# Patient Record
Sex: MALE | Race: WHITE
[De-identification: names, ages, dates, MRNs, and addresses within clinical notes are randomized per-mention and may not be internally consistent; named-entity substitution may affect disease eponyms.]

---

## 2018-07-15 ENCOUNTER — HOSPITAL ENCOUNTER (OUTPATIENT)
Dept: HOSPITAL 31 - C.ER | Age: 28
Setting detail: OBSERVATION
LOS: 2 days | Discharge: HOME | End: 2018-07-17
Attending: FAMILY MEDICINE | Admitting: FAMILY MEDICINE
Payer: COMMERCIAL

## 2018-07-15 VITALS — RESPIRATION RATE: 20 BRPM

## 2018-07-15 VITALS — BODY MASS INDEX: 36.2 KG/M2

## 2018-07-15 DIAGNOSIS — L03.116: ICD-10-CM

## 2018-07-15 DIAGNOSIS — I80.02: Primary | ICD-10-CM

## 2018-07-15 DIAGNOSIS — N50.819: ICD-10-CM

## 2018-07-15 DIAGNOSIS — I83.892: ICD-10-CM

## 2018-07-15 DIAGNOSIS — F17.210: ICD-10-CM

## 2018-07-15 LAB
BASOPHILS # BLD AUTO: 0.1 K/UL (ref 0–0.2)
BASOPHILS NFR BLD: 0.5 % (ref 0–2)
BUN SERPL-MCNC: 16 MG/DL (ref 9–20)
CALCIUM SERPL-MCNC: 9.2 MG/DL (ref 8.6–10.4)
EOSINOPHIL # BLD AUTO: 0.3 K/UL (ref 0–0.7)
EOSINOPHIL NFR BLD: 2.7 % (ref 0–4)
ERYTHROCYTE [DISTWIDTH] IN BLOOD BY AUTOMATED COUNT: 14.1 % (ref 11.5–14.5)
GFR NON-AFRICAN AMERICAN: > 60
HGB BLD-MCNC: 13.4 G/DL (ref 12–18)
LYMPHOCYTES # BLD AUTO: 2.6 K/UL (ref 1–4.3)
LYMPHOCYTES NFR BLD AUTO: 21.6 % (ref 20–40)
MCH RBC QN AUTO: 30.2 PG (ref 27–31)
MCHC RBC AUTO-ENTMCNC: 34.5 G/DL (ref 33–37)
MCV RBC AUTO: 87.4 FL (ref 80–94)
MONOCYTES # BLD: 0.7 K/UL (ref 0–0.8)
MONOCYTES NFR BLD: 5.6 % (ref 0–10)
NEUTROPHILS # BLD: 8.3 K/UL (ref 1.8–7)
NEUTROPHILS NFR BLD AUTO: 69.6 % (ref 50–75)
NRBC BLD AUTO-RTO: 0 % (ref 0–2)
PLATELET # BLD: 157 K/UL (ref 130–400)
PMV BLD AUTO: 9.8 FL (ref 7.2–11.7)
RBC # BLD AUTO: 4.44 MIL/UL (ref 4.4–5.9)
WBC # BLD AUTO: 12 K/UL (ref 4.8–10.8)

## 2018-07-15 PROCEDURE — 80202 ASSAY OF VANCOMYCIN: CPT

## 2018-07-15 PROCEDURE — 83615 LACTATE (LD) (LDH) ENZYME: CPT

## 2018-07-15 PROCEDURE — 93971 EXTREMITY STUDY: CPT

## 2018-07-15 PROCEDURE — 96372 THER/PROPH/DIAG INJ SC/IM: CPT

## 2018-07-15 PROCEDURE — 90732 PPSV23 VACC 2 YRS+ SUBQ/IM: CPT

## 2018-07-15 PROCEDURE — 36415 COLL VENOUS BLD VENIPUNCTURE: CPT

## 2018-07-15 PROCEDURE — 80361 OPIATES 1 OR MORE: CPT

## 2018-07-15 PROCEDURE — 76870 US EXAM SCROTUM: CPT

## 2018-07-15 PROCEDURE — 74177 CT ABD & PELVIS W/CONTRAST: CPT

## 2018-07-15 PROCEDURE — 90471 IMMUNIZATION ADMIN: CPT

## 2018-07-15 PROCEDURE — 80353 DRUG SCREENING COCAINE: CPT

## 2018-07-15 PROCEDURE — 80048 BASIC METABOLIC PNL TOTAL CA: CPT

## 2018-07-15 PROCEDURE — 80324 DRUG SCREEN AMPHETAMINES 1/2: CPT

## 2018-07-15 PROCEDURE — 81001 URINALYSIS AUTO W/SCOPE: CPT

## 2018-07-15 PROCEDURE — 71260 CT THORAX DX C+: CPT

## 2018-07-15 PROCEDURE — 96374 THER/PROPH/DIAG INJ IV PUSH: CPT

## 2018-07-15 PROCEDURE — 83735 ASSAY OF MAGNESIUM: CPT

## 2018-07-15 PROCEDURE — 80358 DRUG SCREENING METHADONE: CPT

## 2018-07-15 PROCEDURE — 99285 EMERGENCY DEPT VISIT HI MDM: CPT

## 2018-07-15 PROCEDURE — 83992 ASSAY FOR PHENCYCLIDINE: CPT

## 2018-07-15 PROCEDURE — 96365 THER/PROPH/DIAG IV INF INIT: CPT

## 2018-07-15 PROCEDURE — 80053 COMPREHEN METABOLIC PANEL: CPT

## 2018-07-15 PROCEDURE — 85025 COMPLETE CBC W/AUTO DIFF WBC: CPT

## 2018-07-15 PROCEDURE — 80074 ACUTE HEPATITIS PANEL: CPT

## 2018-07-15 PROCEDURE — 80345 DRUG SCREENING BARBITURATES: CPT

## 2018-07-15 PROCEDURE — 87040 BLOOD CULTURE FOR BACTERIA: CPT

## 2018-07-15 PROCEDURE — 80346 BENZODIAZEPINES1-12: CPT

## 2018-07-15 PROCEDURE — 87086 URINE CULTURE/COLONY COUNT: CPT

## 2018-07-15 PROCEDURE — 84100 ASSAY OF PHOSPHORUS: CPT

## 2018-07-15 PROCEDURE — 80349 CANNABINOIDS NATURAL: CPT

## 2018-07-15 PROCEDURE — 86703 HIV-1/HIV-2 1 RESULT ANTBDY: CPT

## 2018-07-15 PROCEDURE — 73701 CT LOWER EXTREMITY W/DYE: CPT

## 2018-07-15 RX ADMIN — ENOXAPARIN SODIUM SCH MG: 120 INJECTION SUBCUTANEOUS at 19:30

## 2018-07-15 RX ADMIN — VANCOMYCIN HYDROCHLORIDE SCH MLS/HR: 1 INJECTION, POWDER, LYOPHILIZED, FOR SOLUTION INTRAVENOUS at 22:01

## 2018-07-15 RX ADMIN — TAZOBACTAM SODIUM AND PIPERACILLIN SODIUM SCH MLS/HR: 375; 3 INJECTION, SOLUTION INTRAVENOUS at 19:15

## 2018-07-15 RX ADMIN — Medication SCH: at 19:16

## 2018-07-15 NOTE — C.PDOC
History Of Present Illness


28 year old male presents to the ER complaining of increasing redness and pain 

to left thigh for 3 days. Patient denies trauma/injuries or any recent travel. 

Also denies fever, chills, vomiting, nausea or diarrhea. Patient has no other 

medical complaints. 


Time Seen by Provider: 07/15/18 10:12


Chief Complaint (Nursing): Lower Extremity Problem/Injury


History Per: Patient


History/Exam Limitations: no limitations


Onset/Duration Of Symptoms: Days (3)


Current Symptoms Are (Timing): Still Present





Past Medical History


Reviewed: Historical Data, Nursing Documentation, Vital Signs


Vital Signs: 


 Last Vital Signs











Temp  98.8 F   07/15/18 13:19


 


Pulse  70   07/15/18 13:19


 


Resp  16   07/15/18 13:19


 


BP  126/72   07/15/18 13:19


 


Pulse Ox  99   07/15/18 15:28














- Medical History


PMH: Back Problems


Surgical History: No Surg Hx


Family History: States: No Known Family Hx





- Social History


Hx Alcohol Use: Yes


Hx Substance Use: No





- Immunization History


Hx Tetanus Toxoid Vaccination: No


Hx Influenza Vaccination: No


Hx Pneumococcal Vaccination: No





Review Of Systems


Except As Marked, All Systems Reviewed And Found Negative.


Constitutional: Negative for: Fever, Chills


Gastrointestinal: Negative for: Nausea, Vomiting, Diarrhea


Skin: Positive for: Other (Pain and increasing redness to left thigh )





Physical Exam





- Physical Exam


Appears: Non-toxic, No Acute Distress


Skin: Other (Cullilitis with diffused induration to the dorsal distal aspect of 

left thigh. Questionable fluctuance versus induration. Large cellulitis on 

front thigh. Palpable cord. No gross line. Skin intact.  )


Head: Atraumatic, Normacephalic


Eye(s): bilateral: Normal Inspection


Nose: Normal


Oral Mucosa: Moist


Neck: Supple


Chest: Symmetrical


Cardiovascular: Rhythm Regular


Respiratory: Other (NARD)


Extremity: Normal ROM


Neurological/Psych: Oriented x3, Normal Speech


Gait: Steady





ED Course And Treatment





- Laboratory Results


Result Diagrams: 


 07/15/18 11:05





 07/15/18 11:05


O2 Sat by Pulse Oximetry: 99 (RA)


Pulse Ox Interpretation: Normal





- CT Scan/US


  ** CT Left lower ext 


Other Rad Studies (CT/US): Read By Radiologist, Radiology Report Reviewed


CT/US Interpretation: Accession No. : [id number].  Patient Name / ID : 

SYEDA OSCAR  / [id number].  Exam Date : 07/15/2018 14:07:38 ( Approved ).  

Study Comment :  Sex / Age : M  / 28Y.  Creator : Anastacio Aguilera MD.  

Dictator : Anastacio Aguilera MD.   :  Approver : Anastacio Aguilera MD.  Approver2 :  Report Date : 07/15/2018 15:00:08.  My Comment :  ************

***********************************************************************.  CT 

left thigh.  History: Pain and swelling.  Comparison: None available.  Technique

: Multiple contiguous axial images were performed through the left thigh with 

the use of intravenous contrast.  Subsequently, sagittal and coronal 

reformatted images were.  This CT exam was performed using one or more of the 

following dose reduction techniques: Automated exposure control, adjustment of 

the mA and/or kV according to patient size, and/or use of iterative 

reconstruction technique.  Findings:  Diffuse thickening of the anterior skin 

and subcutaneous soft tissues of the left thigh with associated reticulation 

and edema within the soft tissues suggestive for an underlying cellulitis.  At 

the site of the palpable abnormality as well as extending throughout the entire 

extent of the left anterior thigh are multiple large prominent vessels/

vasculature. The vessels are markedly enlarged and tortuous in comparison to 

the right thigh and may represent vascular malformations versus thrombosed 

varicosities versus additional etiology.  Clinical correlation.  Prominent 

lymphadenopathy within the left inguinal region measuring up to 3.8 x 2.3 

centimeters.  The visualized musculature appears preserved.  1.3 centimeter 

radiodense focus seen within the distal medullary cavity of the left femur of 

uncertain clinical etiology possibly representing a chondroid focus versus 

prominent bone island versus additional etiology.  Additional more sclerotic 

foci seen more laterally within the diaphysis of the distal left femur which 

may represent a small bone island.  Productive change at the level of the 

medial femoral condyle which may represent old MCL injury and/or Shantanu-

Stieda disease.  Impression:  1. Diffuse thickening of the anterior skin and 

subcutaneous soft tissues of the left thigh with associated reticulation and 

edema within the soft tissues suggestive for an underlying cellulitis.  2. At 

the site of the palpable abnormality as well as extending throughout the entire 

extent of the left anterior thigh are multiple large prominent vessels/

vasculature. The vessels are markedly enlarged and tortuous in comparison to 

the right thigh and may represent vascular malformations versus thrombosed 

varicosities versus additional etiology.  Clinical correlation.  3 Prominent 

lymphadenopathy within the left inguinal region measuring up to 3.8 x 2.3 

centimeters.  4. 1.3 centimeter radiodense focus seen within the distal 

medullary cavity of the left femur of uncertain clinical etiology possibly 

representing a chondroid focus versus prominent bone island versus additional 

etiology.  Additional findings as above.


Progress Note: CT Left Lower Ext ordered and results reviewed. Labs and blood 

work ordered.  Patient given Toradol 30mg IVP and Morphine 2mg IVP.





Progress





- Re-Evaluation


Re-evaluation Note: 





07/15/18 12:29


PENDING CT APPEARS COMFORTABLE.


07/15/18 13:29


PENDING CT


07/15/18 15:27


D/W DR THONY DE LA CRUZ WILL ADMIT





- Data Reviewed


Data Reviewed: Lab, Diagnostic imaging





Disposition


Counseled Patient/Family Regarding: Studies Performed, Diagnosis





- Disposition


Disposition: HOSPITALIZED


Disposition Time: 15:28


Condition: STABLE





- POA


Present On Arrival: None





- Clinical Impression


Clinical Impression: 


 Cellulitis








- Scribe Statement


The provider has reviewed the documentation as recorded by the Reva Patterson








All medical record entries made by the Ireneibrisa were at my direction and 

personally dictated by me. I have reviewed the chart and agree that the record 

accurately reflects my personal performance of the history, physical exam, 

medical decision making, and the department course for this patient. I have 

also personally directed, reviewed, and agree with the discharge instructions 

and disposition.





Decision To Admit





- Pt Status Changed To:


Hospital Disposition Of: Observation





- .


Bed Request Type: Regular


Admitting Physician: Lobito De La Cruz


Patient Diagnosis: 


 Cellulitis

## 2018-07-15 NOTE — CT
CT left thigh 



History: Pain and swelling. 



Comparison: None available. 



Technique: Multiple contiguous axial images were performed through 

the left thigh with the use of intravenous contrast.  Subsequently, 

sagittal and coronal reformatted images were. 



This CT exam was performed using one or more of the following dose 

reduction techniques: Automated exposure control, adjustment of the 

mA and/or kV according to patient size, and/or use of iterative 

reconstruction technique. 



Findings: 



Diffuse thickening of the anterior skin and subcutaneous soft tissues 

of the left thigh with associated reticulation and edema within the 

soft tissues suggestive for an underlying cellulitis. 



At the site of the palpable abnormality as well as extending 

throughout the entire extent of the left anterior thigh are multiple 

large prominent vessels/vasculature. The vessels are markedly 

enlarged and tortuous in comparison to the right thigh and may 

represent vascular malformations versus thrombosed varicosities 

versus additional etiology.  Clinical correlation. 



Prominent lymphadenopathy within the left inguinal region measuring 

up to 3.8 x 2.3 centimeters. 



The visualized musculature appears preserved. 



1.3 centimeter radiodense focus seen within the distal medullary 

cavity of the left femur of uncertain clinical etiology possibly 

representing a chondroid focus versus prominent bone island versus 

additional etiology. 



Additional more sclerotic foci seen more laterally within the 

diaphysis of the distal left femur which may represent a small bone 

island. 



Productive change at the level of the medial femoral condyle which 

may represent old MCL injury and/or Shantanu-Stieda disease. 



Impression: 



1. Diffuse thickening of the anterior skin and subcutaneous soft 

tissues of the left thigh with associated reticulation and edema 

within the soft tissues suggestive for an underlying cellulitis. 



2. At the site of the palpable abnormality as well as extending 

throughout the entire extent of the left anterior thigh are multiple 

large prominent vessels/vasculature. The vessels are markedly 

enlarged and tortuous in comparison to the right thigh and may 

represent vascular malformations versus thrombosed varicosities 

versus additional etiology.  Clinical correlation. 



3 Prominent lymphadenopathy within the left inguinal region measuring 

up to 3.8 x 2.3 centimeters. 



4. 1.3 centimeter radiodense focus seen within the distal medullary 

cavity of the left femur of uncertain clinical etiology possibly 

representing a chondroid focus versus prominent bone island versus 

additional etiology. 



Additional findings as above.

## 2018-07-15 NOTE — CP.PCM.HP
<Stan Calles - Last Filed: 07/15/18 16:16>





History of Present Illness





- History of Present Illness


History of Present Illness: 


CC: left leg pain


HPI: 28 year old  male w/ no PMHx presents to ED w/ 3 days history of 

left upper leg swelling with increasing pain. The pain started as a small nodule

, just superior to the knee that has spread superiorly to his inguinal area. 

Patient states that the leg feels warmer and is unable to stand w/o pain. Pain 

is currently rated as a 6/10. Patient reports trying some antibiotics from [place] 

[place] country [place]unsure of name[place] for 2 days prior w/ no relief of symptoms.  

Patient reports having a similar episode 1 year prior that resolved with some 

antibiotics. 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 

trauma to the area.   





ROS: Patient denies fevers, chills, night sweats, weight loss, chest pain, 

shortness of breath, abdominal pain, nausea, vomiting, lower extremity 

swelling. Patient states he has sometimes difficulty voiding for the past 

several months. 





PMD: None


PMHx: None


PSHx: None


Meds: None 


Allergies: None


FHx: Father, HTN, MI,  at 75 yr; Mother, HTN, living; Sister, healthy, 

living


Social: Lives with wife, Works as a cook, Smokes 1-2 cigarettes/day for 14 years

, drinks socially, smokes marijuana 





Code status: Full code


Emergency Contact: WifeEbonie: [phone]











Present on Admission





- Present on Admission


Any Indicators Present on Admission: No





Review of Systems





- Constitutional


Constitutional: absent: Chills, Fever, Headache, Malaise, Weight Loss





- EENT


Eyes: absent: Blurred Vision, Diplopia, Dry Eye, Irritation


Nose/Mouth/Throat: absent: Dry Mouth, Halitosis, Mouth Lesions





- Cardiovascular


Cardiovascular: absent: Chest Pain, Leg Edema, Palpitations, Pedal Edema





- Respiratory


Respiratory: absent: Cough, Dyspnea, Wheezing





- Gastrointestinal


Gastrointestinal: absent: Abdominal Pain, Diarrhea, Dyspepsia





- Genitourinary


Genitourinary: Voiding Freq/Small Amts.  absent: Flank Pain, Hematuria





- Musculoskeletal


Musculoskeletal: Back Pain.  absent: Joint Swelling, Muscle Weakness, Myalgias, 

Neck Pain





- Integumentary


Integumentary: absent: Photosensitivity, Sores, Striae





- Neurological


Neurological: absent: Burning Sensations, Numbness, Focal Weakness, Loss of 

Vision





- Hematologic/Lymphatic


Hematologic: absent: Easy Bleeding, Easy Bruising





Past Patient History





- Past Social History


Smoking Status: Light Smoker < 10 Cigarettes Daily





- CARDIAC


Hx Cardiac Disorders: No





- PSYCHIATRIC


Hx Substance Use: No





- SURGICAL HISTORY


Hx Surgeries: No





- ANESTHESIA


Hx Anesthesia: No


Hx Anesthesia Reactions: No





Meds


Allergies/Adverse Reactions: 


 Allergies











Allergy/AdvReac Type Severity Reaction Status Date / Time


 


No Known Allergies Allergy   Verified 07/15/18 09:53














Physical Exam





- Constitutional


Appears: Well, Non-toxic, No Acute Distress





- Head Exam


Head Exam: ATRAUMATIC, NORMAL INSPECTION, NORMOCEPHALIC





- Eye Exam


Eye Exam: EOMI, Normal appearance


Pupil Exam: NORMAL ACCOMODATION, PERRL





- ENT Exam


ENT Exam: Mucous Membranes Moist





- Respiratory Exam


Respiratory Exam: Clear to Auscultation Bilateral, NORMAL BREATHING PATTERN.  

absent: Rhonchi, Wheezes, Respiratory Distress





- Cardiovascular Exam


Cardiovascular Exam: +S1, +S2.  absent: Irregular Rhythm, Systolic Murmur





- GI/Abdominal Exam


GI & Abdominal Exam: Normal Bowel Sounds, Soft.  absent: Distended, Firm, 

Guarding





- Extremities Exam


Extremities exam: Positive for: full ROM, normal inspection, pedal pulses 

present.  Negative for: calf tenderness, pedal edema


Additional comments: 





- LLE, warm to touch at thigh area


- Discoloration 1/2 way upper thigh, likely tan from shorts - as clear, 

straight line demarcation


- Supper 1/3 of thigh has blanchable erythema


- Palpable chord that starts near lateral area of patella that moves superiorly 

and runs medially into the inguinal region





- Back Exam


Back exam: NORMAL INSPECTION





- Neurological Exam


Neurological exam: Alert, CN II-XII Intact, Oriented x3





- Psychiatric Exam


Psychiatric exam: Normal Affect, Normal Mood





- Skin


Skin Exam: Dry, Intact, Normal Color, Warm


Additional comments: 





- LLE, warm to touch at thigh area


- Discoloration 1/2 way upper thigh, likely tan from shorts - as clear, 

straight line demarcation


- Supper 1/3 of thigh has blanchable erythema


- Palpable chord that starts near lateral area of patella that moves superiorly 

and runs medially into the inguinal region





Results





- Vital Signs


Recent Vital Signs: 





 Last Vital Signs











Temp  98.8 F   07/15/18 13:19


 


Pulse  70   07/15/18 13:19


 


Resp  16   07/15/18 13:19


 


BP  126/72   07/15/18 13:19


 


Pulse Ox  99   07/15/18 15:49














- Labs


Result Diagrams: 


 07/15/18 11:05





 07/15/18 11:05


Labs: 





 Laboratory Results - last 24 hr











  07/15/18 07/15/18





  11:05 11:05


 


WBC  12.0 H 


 


RBC  4.44 


 


Hgb  13.4 


 


Hct  38.9 


 


MCV  87.4 


 


MCH  30.2 


 


MCHC  34.5 


 


RDW  14.1 


 


Plt Count  157 


 


MPV  9.8 


 


Neut % (Auto)  69.6 


 


Lymph % (Auto)  21.6 


 


Mono % (Auto)  5.6 


 


Eos % (Auto)  2.7 


 


Baso % (Auto)  0.5 


 


Neut # (Auto)  8.3 H 


 


Lymph # (Auto)  2.6 


 


Mono # (Auto)  0.7 


 


Eos # (Auto)  0.3 


 


Baso # (Auto)  0.1 


 


Sodium   143


 


Potassium   3.6


 


Chloride   104


 


Carbon Dioxide   28


 


Anion Gap   14


 


BUN   16


 


Creatinine   0.8


 


Est GFR ( Amer)   > 60


 


Est GFR (Non-Af Amer)   > 60


 


Random Glucose   84


 


Calcium   9.2














Assessment & Plan





- Assessment and Plan (Free Text)


Assessment: 





1) Left thigh cellulits &  thrombophlebitis


- 7/15 CT results: 


   1. Diffuse thickening of the anterior skin and subcutaneous soft tissues of 

the left thigh with associated reticulation and edema within the soft tissues 

   suggestive for an underlying cellulitis. 


   2. At the site of the palpable abnormality as well as extending throughout 

the entire extent of the left anterior thigh are multiple large prominent 

vessels/vasculature. The vessels are markedly enlarged and tortuous in 

comparison to the right thigh and may represent vascular malformations versus 

thrombosed varicosities versus additional etiology.  Clinical correlation. 


   3 Prominent lymphadenopathy within the left inguinal region measuring up to 

3.8 x 2.3 centimeters. 


   4. 1.3 centimeter radiodense focus seen within the distal medullary cavity 

of the left femur of uncertain clinical etiology possibly representing a 

chondroid focus versus prominent bone island versus additional etiology. 





- Vancomycin 1 gm Q12H


- F/u Vanc troph 11:30 PM 18


- Zosyn 3.375 gm Q6 Hr


- F/u blood cultures from 7/15


- Ibuprofen 600 mg PO Q8H


- Warm compress to entire left thigh


- Keep left leg elevated above, level of heart


- Toradol 15 mg IV Q6H Mod Pain PRN


- Toradol 30 mg IV Q6H Severe Pain pRN


- Protonix 40m PO Daily


- Venous Duplex L left r/o DVT


- Lovenox 120 mg Sc Q12H 6PM (D/Cif venous duplex negative





2) Leukocytosis


- WBC slightly elevated w/o shift


- continue to monitor





3) Unable to void complete


- perform prostate examination once to the floors 





4) Prophylaxis


- Protonix as above


- Lovonox


- Heart healthy 2 gm low carb diet


 





<Lobito De La Cruz - Last Filed: 07/15/18 20:57>





Results





- Vital Signs


Recent Vital Signs: 





 Last Vital Signs











Temp  98.4 F   07/15/18 20:46


 


Pulse  47 L  07/15/18 20:46


 


Resp  20   07/15/18 20:46


 


BP  146/81   07/15/18 20:46


 


Pulse Ox  98   07/15/18 20:46














- Labs


Result Diagrams: 


 07/15/18 11:05





 07/15/18 11:05


Labs: 





 Laboratory Results - last 24 hr











  07/15/18 07/15/18





  11:05 11:05


 


WBC  12.0 H 


 


RBC  4.44 


 


Hgb  13.4 


 


Hct  38.9 


 


MCV  87.4 


 


MCH  30.2 


 


MCHC  34.5 


 


RDW  14.1 


 


Plt Count  157 


 


MPV  9.8 


 


Neut % (Auto)  69.6 


 


Lymph % (Auto)  21.6 


 


Mono % (Auto)  5.6 


 


Eos % (Auto)  2.7 


 


Baso % (Auto)  0.5 


 


Neut # (Auto)  8.3 H 


 


Lymph # (Auto)  2.6 


 


Mono # (Auto)  0.7 


 


Eos # (Auto)  0.3 


 


Baso # (Auto)  0.1 


 


Sodium   143


 


Potassium   3.6


 


Chloride   104


 


Carbon Dioxide   28


 


Anion Gap   14


 


BUN   16


 


Creatinine   0.8


 


Est GFR ( Amer)   > 60


 


Est GFR (Non-Af Amer)   > 60


 


Random Glucose   84


 


Calcium   9.2














Attending/Attestation





- Attestation


I have personally seen and examined this patient.: Yes


I have fully participated in the care of the patient.: Yes


I have reviewed all pertinent clinical information: Yes


Notes (Text): 





07/15/18 20:56


Patient was seen and examined with resident Dr. Calles.





History, exam, assessment and plan were gone over with the resident.





F/U with ID Dr. Johnson and Vascular Surgeon Dr. Buck for further 

recommendations.





Lobito De La Cruz D.O.

## 2018-07-16 LAB
ALBUMIN SERPL-MCNC: 3.7 G/DL (ref 3.5–5)
ALBUMIN/GLOB SERPL: 1.3 {RATIO} (ref 1–2.1)
ALT SERPL-CCNC: 34 U/L (ref 21–72)
AST SERPL-CCNC: 31 U/L (ref 17–59)
BASOPHILS # BLD AUTO: 0 K/UL (ref 0–0.2)
BASOPHILS NFR BLD: 0.5 % (ref 0–2)
BUN SERPL-MCNC: 15 MG/DL (ref 9–20)
CALCIUM SERPL-MCNC: 8.6 MG/DL (ref 8.6–10.4)
EOSINOPHIL # BLD AUTO: 0.3 K/UL (ref 0–0.7)
EOSINOPHIL NFR BLD: 3.8 % (ref 0–4)
ERYTHROCYTE [DISTWIDTH] IN BLOOD BY AUTOMATED COUNT: 14.1 % (ref 11.5–14.5)
GFR NON-AFRICAN AMERICAN: > 60
HGB BLD-MCNC: 13 G/DL (ref 12–18)
LYMPHOCYTES # BLD AUTO: 2 K/UL (ref 1–4.3)
LYMPHOCYTES NFR BLD AUTO: 21.7 % (ref 20–40)
MCH RBC QN AUTO: 30.4 PG (ref 27–31)
MCHC RBC AUTO-ENTMCNC: 35.1 G/DL (ref 33–37)
MCV RBC AUTO: 86.7 FL (ref 80–94)
MONOCYTES # BLD: 0.6 K/UL (ref 0–0.8)
MONOCYTES NFR BLD: 6.8 % (ref 0–10)
NEUTROPHILS # BLD: 6.2 K/UL (ref 1.8–7)
NEUTROPHILS NFR BLD AUTO: 67.2 % (ref 50–75)
NRBC BLD AUTO-RTO: 0.1 % (ref 0–2)
PLATELET # BLD: 142 K/UL (ref 130–400)
PMV BLD AUTO: 9.6 FL (ref 7.2–11.7)
RBC # BLD AUTO: 4.29 MIL/UL (ref 4.4–5.9)
WBC # BLD AUTO: 9.2 K/UL (ref 4.8–10.8)

## 2018-07-16 RX ADMIN — VANCOMYCIN HYDROCHLORIDE SCH MLS/HR: 1 INJECTION, POWDER, LYOPHILIZED, FOR SOLUTION INTRAVENOUS at 10:41

## 2018-07-16 RX ADMIN — TAZOBACTAM SODIUM AND PIPERACILLIN SODIUM SCH MLS/HR: 375; 3 INJECTION, SOLUTION INTRAVENOUS at 00:16

## 2018-07-16 RX ADMIN — Medication SCH MG: at 17:34

## 2018-07-16 RX ADMIN — ENOXAPARIN SODIUM SCH: 120 INJECTION SUBCUTANEOUS at 07:00

## 2018-07-16 RX ADMIN — TAZOBACTAM SODIUM AND PIPERACILLIN SODIUM SCH MLS/HR: 375; 3 INJECTION, SOLUTION INTRAVENOUS at 17:33

## 2018-07-16 RX ADMIN — VANCOMYCIN HYDROCHLORIDE SCH MLS/HR: 1 INJECTION, POWDER, LYOPHILIZED, FOR SOLUTION INTRAVENOUS at 22:07

## 2018-07-16 RX ADMIN — PANTOPRAZOLE SODIUM SCH MG: 40 TABLET, DELAYED RELEASE ORAL at 10:33

## 2018-07-16 RX ADMIN — TAZOBACTAM SODIUM AND PIPERACILLIN SODIUM SCH MLS/HR: 375; 3 INJECTION, SOLUTION INTRAVENOUS at 05:39

## 2018-07-16 RX ADMIN — Medication SCH MG: at 10:34

## 2018-07-16 RX ADMIN — TAZOBACTAM SODIUM AND PIPERACILLIN SODIUM SCH MLS/HR: 375; 3 INJECTION, SOLUTION INTRAVENOUS at 12:36

## 2018-07-16 NOTE — VASCLAB
Date of service: 



07/16/2018



PROCEDURE:  Left Lower Extremity Venous Duplex Exam. 



HISTORY:

Superficial Thrombophlebitis., R/O DVT 



PRIORS:

None. 



TECHNIQUE:

Left common femoral, femoral, popliteal and posterior tibial, 

peroneal and great saphenous veins were evaluated. Flow was assessed 

with color Doppler, compressibility, assessment of phasic flow and 

augmentation response.



Report prepared by   RUPERTO Saunders



FINDINGS:



LEFT:

1. Common Femoral Vein:



1.1.  Compressibility - Fully compressible: Thrombus - None : Flow - 

Phasic: Augmentation -Normal: Reflux - None.



2. Femoral Vein: 



2.1. Compressibility - Fully compressible: Thrombus -  None: Flow - 

Phasic: Augmentation -Normal: Reflux - None.



3. Popliteal Vein: 



3.1. Compressibility - Fully compressible: Thrombus -  None: Flow - 

Phasic: Augmentation -Normal: Reflux - None.



4. Posterior Tibial Vein: 



4.1. Compressibility - Fully compressible: Thrombus -  None: Flow - 

Phasic: Augmentation -Normal: Reflux - None.



5. Peroneal Vein: 



5.1. Compressibility - Fully compressible: Thrombus -  None: Flow - 

Phasic: Augmentation -Normal: Reflux - None.



6. Great Saphenous Vein:

6.1.  Compressibility - Fully compressible: Thrombus - None: Flow - 

Phasic: Augmentation - Normal: Reflux - None.





OTHER FINDINGS:  



IMPRESSION:

1. Superficial phlebitis in the left lower extremity, involving 

multiple varicose veins in the outer lateral low thigh and knee 

areas. 



2. No evidence of deep  vein thrombosis of the left lower extremity.



Normal venous flow noted in the right common femoral vein.

## 2018-07-16 NOTE — CP.PCM.CON
History of Present Illness





- History of Present Illness


History of Present Illness: 





28 year old  male  presents to ED w/ 3 days history of left upper leg 

swelling with increasing pain. The pain started as a small nodule, just 

superior to the knee that has spread superiorly to his inguinal area. He 

reports [place] some antibiotics from [place]ure of name[place] for 2 

days prior w/ no relief of symptoms.  





ROS: Patient denies fevers, chills, night sweats, weight loss, chest pain, 

shortness of breath, abdominal pain, nausea, vomiting, lower extremity 

swelling. Patient states he has sometimes difficulty voiding for the past 

several months. 





PMD: None


PMHx: None


PSHx: None


Meds: None 


Allergies: None


FHx: Father, HTN, MI,  at 75 yr; Mother, HTN, living; Sister, healthy, 

living


Social: Lives with wife, Works as a cook, Smokes 1-2 cigarettes/day for 14 years

, drinks socially, smokes marijuana 





Code status: Full code


Emergency Contact: Wife, Ebonie Fuller: [phone]











Present on Admission





- Present on Admission


Any Indicators Present on Admission: No





Review of Systems





- Constitutional


Constitutional: absent: Chills, Fever, Headache, Malaise, Weight Loss





- EENT


Eyes: absent: Blurred Vision, Diplopia, Dry Eye, Irritation


Nose/Mouth/Throat: absent: Dry Mouth, Halitosis, Mouth Lesions





- Cardiovascular


Cardiovascular: absent: Chest Pain, Leg Edema, Palpitations, Pedal Edema





- Respiratory


Respiratory: absent: Cough, Dyspnea, Wheezing





- Gastrointestinal


Gastrointestinal: absent: Abdominal Pain, Diarrhea, Dyspepsia





- Genitourinary


Genitourinary: Voiding Freq/Small Amts.  absent: Flank Pain, Hematuria





- Musculoskeletal


Musculoskeletal: Back Pain.  absent: Joint Swelling, Muscle Weakness, Myalgias, 

Neck Pain





- Integumentary


Integumentary: absent: Photosensitivity, Sores, Striae





- Neurological


Neurological: absent: Burning Sensations, Numbness, Focal Weakness, Loss of 

Vision





- Hematologic/Lymphatic


Hematologic: absent: Easy Bleeding, Easy Bruising





Past Patient History





- Past Social History


Smoking Status: Light Smoker < 10 Cigarettes Daily





- CARDIAC


Hx Cardiac Disorders: No





- PSYCHIATRIC


Hx Substance Use: No





- SURGICAL HISTORY


Hx Surgeries: No





- ANESTHESIA


Hx Anesthesia: No


Hx Anesthesia Reactions: No





Meds


Allergies/Adverse Reactions: 


 Allergies











Allergy/AdvReac Type Severity Reaction Status Date / Time


 


No Known Allergies Allergy   Verified 07/15/18 09:53














- Medications


Medications: 


 Current Medications





Vancomycin/Sodium Chloride (Vancomycin 1 Gm/Ns 200 Ml)  1 gm in 200 mls @ 166.6 

mls/hr IVPB Q12H [facility]


   PRN Reason: Protocol


   Stop: 18 23:01


   Last Admin: 18 10:41 Dose:  166.6 mls/hr


Piperacillin Sod/Tazobactam Sod (Zosyn 3.375 Gm Iv Premix)  3.375 gm in 50 mls 

@ 100 mls/hr IVPB Q6H [facility]


   PRN Reason: Protocol


   Last Admin: 18 12:36 Dose:  100 mls/hr


Ibuprofen (Motrin Tab)  600 mg PO TID [facility]


   Last Admin: 18 10:34 Dose:  600 mg


Ketorolac Tromethamine (Toradol)  30 mg IVP Q6H PRN


   PRN Reason: Pain, severe (8-10)


   Last Admin: 18 12:40 Dose:  30 mg


Ketorolac Tromethamine (Toradol)  15 mg IVP Q6H PRN


   PRN Reason: Pain, moderate (4-7)


Pantoprazole Sodium (Protonix Ec Tab)  40 mg PO DAILY [facility]


   Last Admin: 18 10:33 Dose:  40 mg


Pneumococcal Polyvalent Vaccine (Pneumovax 23 Vaccine)  0.5 ml IM .ONCE ONE


   Stop: 18 10:01


Saccharomyces Boulardii (Florastor)  250 mg PO BID [facility]


   Last Admin: 18 10:34 Dose:  250 mg











Physical Exam





- Constitutional


Appears: Chronically Ill





- Head Exam


Head Exam: ATRAUMATIC





- Eye Exam


Eye Exam: Scleral icterus





- ENT Exam


ENT Exam: Mucous Membranes Dry, Normal External Ear Exam





- Neck Exam


Neck exam: Negative for: Lymphadenopathy





- Respiratory Exam


Respiratory Exam: Decreased Breath Sounds





- Cardiovascular Exam


Cardiovascular Exam: REGULAR RHYTHM





- GI/Abdominal Exam


GI & Abdominal Exam: Diminished Bowel Sounds





- Rectal Exam


Rectal Exam: Deferred





- Extremities Exam


Extremities exam: Positive for: tenderness, pedal pulses present.  Negative for

: calf tenderness, normal inspection, pedal edema


Additional comments: 





swollen left leg over saphenous vein with induration of varicosities  + tender 





- Back Exam


Back exam: absent: CVA tenderness (L), CVA tenderness (R), paraspinal tenderness





- Neurological Exam


Neurological exam: Alert, CN II-XII Intact, Oriented x3, Reflexes Normal





- Psychiatric Exam


Psychiatric exam: Normal Mood





- Skin


Skin Exam: Erythema





Results





- Vital Signs


Recent Vital Signs: 


 Last Vital Signs











Temp  98.5 F   18 08:00


 


Pulse  52 L  18 08:00


 


Resp  20   18 08:00


 


BP  132/92 H  18 08:00


 


Pulse Ox  99   18 08:00














- Labs


Result Diagrams: 


 18 06:52





 18 06:52


Labs: 


 Laboratory Results - last 24 hr











  18





  06:52 06:52 07:00


 


WBC  9.2  


 


RBC  4.29 L  


 


Hgb  13.0  


 


Hct  37.2  


 


MCV  86.7  


 


MCH  30.4  


 


MCHC  35.1  


 


RDW  14.1  


 


Plt Count  142  


 


MPV  9.6  


 


Neut % (Auto)  67.2  


 


Lymph % (Auto)  21.7  


 


Mono % (Auto)  6.8  


 


Eos % (Auto)  3.8  


 


Baso % (Auto)  0.5  


 


Neut # (Auto)  6.2  


 


Lymph # (Auto)  2.0  


 


Mono # (Auto)  0.6  


 


Eos # (Auto)  0.3  


 


Baso # (Auto)  0.0  


 


Sodium   140 


 


Potassium   3.8 


 


Chloride   105 


 


Carbon Dioxide   26 


 


Anion Gap   12 


 


BUN   15 


 


Creatinine   0.7 L 


 


Est GFR ( Amer)   > 60 


 


Est GFR (Non-Af Amer)   > 60 


 


Random Glucose   86 


 


Calcium   8.6 


 


Total Bilirubin   0.8 


 


AST   31 


 


ALT   34 


 


Alkaline Phosphatase   75 


 


Total Protein   6.7 


 


Albumin   3.7 


 


Globulin   3.0 


 


Albumin/Globulin Ratio   1.3 


 


Urine Opiates Screen    Negative


 


Urine Methadone Screen    Negative


 


Ur Barbiturates Screen    Negative


 


Ur Phencyclidine Scrn    Negative


 


Ur Amphetamines Screen    Negative


 


U Benzodiazepines Scrn    Negative


 


U Oth Cocaine Metabols    Negative


 


U Cannabinoids Screen    Positive H














Assessment & Plan


(1) Phlebitis


Status: Acute   





(2) Cellulitis


Status: Acute   





- Assessment and Plan (Free Text)


Assessment: 





superficial phlebitis/ cellulitis


consider vascular eval


rx iv antibiotics with coverage for strep ands  staph

## 2018-07-16 NOTE — CP.PCM.CON
History of Present Illness





- History of Present Illness


History of Present Illness: 





Vascular Surgery Note: Dr. Buck


28M with no significant  PMHx presented to [facility] ED withs complaint of left 

leg pain. Patient states pain began 3 days ago. He describes pain began along a 

small nodule that had developed in his left medial thigh above knee joint. 

Patient admits to taking non-prescribed antibiotics at home which did not help 

alleviate symptoms. Patient reports erythema and nodularity began to travel 

towards his groin region. Patient reports last year he had a similar episode 

which resolved with antibiotics. At time of examination patient denied fever/

chill, headache/dizziness, chest pain, SOB, n/v/d.   








PMHx/PSHx: None


All: NKDA


Social: Admits to smoking 1-2 cigs/day x14yrs , EtOH use socially, admits to 

smoking marijuana 








Review of Systems





- Review of Systems


Review of Systems: 





12 pt ROS unremarkable except as stated in HPI 





Past Patient History





- Past Social History


Smoking Status: Light Smoker < 10 Cigarettes Daily





- CARDIAC


Hx Cardiac Disorders: No





- PSYCHIATRIC


Hx Substance Use: No





- SURGICAL HISTORY


Hx Surgeries: No





- ANESTHESIA


Hx Anesthesia: No


Hx Anesthesia Reactions: No





Meds


Allergies/Adverse Reactions: 


 Allergies











Allergy/AdvReac Type Severity Reaction Status Date / Time


 


No Known Allergies Allergy   Verified 07/15/18 09:53














- Medications


Medications: 


 Current Medications





Enoxaparin Sodium (Lovenox)  120 mg SC Q12H [facility]


   Last Admin: 07/15/18 19:30 Dose:  120 mg


Vancomycin/Sodium Chloride (Vancomycin 1 Gm/Ns 200 Ml)  1 gm in 200 mls @ 166.6 

mls/hr IVPB Q12H [facility]


   PRN Reason: Protocol


   Stop: 07/20/18 23:01


   Last Admin: 07/15/18 22:01 Dose:  166.6 mls/hr


Piperacillin Sod/Tazobactam Sod (Zosyn 3.375 Gm Iv Premix)  3.375 gm in 50 mls 

@ 100 mls/hr IVPB Q6H [facility]


   PRN Reason: Protocol


   Last Admin: 07/16/18 00:16 Dose:  100 mls/hr


Ibuprofen (Motrin Tab)  600 mg PO TID [facility]


   Last Admin: 07/15/18 19:14 Dose:  600 mg


Ketorolac Tromethamine (Toradol)  30 mg IVP Q6H PRN


   PRN Reason: Pain, severe (8-10)


Ketorolac Tromethamine (Toradol)  15 mg IVP Q6H PRN


   PRN Reason: Pain, moderate (4-7)


Pantoprazole Sodium (Protonix Ec Tab)  40 mg PO DAILY [facility]


Pneumococcal Polyvalent Vaccine (Pneumovax 23 Vaccine)  0.5 ml IM .ONCE ONE


   Stop: 07/17/18 10:01


Saccharomyces Boulardii (Florastor)  250 mg PO BID [facility]


   Last Admin: 07/15/18 19:16 Dose:  Not Given











Physical Exam





- Constitutional


Appears: No Acute Distress





- Head Exam


Head Exam: NORMOCEPHALIC





- Eye Exam


Eye Exam: EOMI, Normal appearance





- ENT Exam


ENT Exam: Mucous Membranes Moist





- Respiratory Exam


Respiratory Exam: NORMAL BREATHING PATTERN





- Cardiovascular Exam


Cardiovascular Exam: +S1, +S2





- GI/Abdominal Exam


GI & Abdominal Exam: Soft





- Extremities Exam


Extremities exam: Positive for: pedal edema, pedal pulses present


Additional comments: 





left thigh superficial thrombophlebitis


cellulitis





- Neurological Exam


Neurological exam: Alert, Oriented x3





- Psychiatric Exam


Psychiatric exam: Normal Mood





- Skin


Skin Exam: Dry, Intact, Warm





Results





- Vital Signs


Recent Vital Signs: 


 Last Vital Signs











Temp  98.4 F   07/15/18 20:46


 


Pulse  41 L  07/15/18 21:15


 


Resp  20   07/15/18 20:46


 


BP  119/73   07/15/18 21:15


 


Pulse Ox  98   07/15/18 22:06














- Labs


Result Diagrams: 


 07/15/18 11:05





 07/15/18 11:05


Labs: 


 Laboratory Results - last 24 hr











  07/15/18 07/15/18





  11:05 11:05


 


WBC  12.0 H 


 


RBC  4.44 


 


Hgb  13.4 


 


Hct  38.9 


 


MCV  87.4 


 


MCH  30.2 


 


MCHC  34.5 


 


RDW  14.1 


 


Plt Count  157 


 


MPV  9.8 


 


Neut % (Auto)  69.6 


 


Lymph % (Auto)  21.6 


 


Mono % (Auto)  5.6 


 


Eos % (Auto)  2.7 


 


Baso % (Auto)  0.5 


 


Neut # (Auto)  8.3 H 


 


Lymph # (Auto)  2.6 


 


Mono # (Auto)  0.7 


 


Eos # (Auto)  0.3 


 


Baso # (Auto)  0.1 


 


Sodium   143


 


Potassium   3.6


 


Chloride   104


 


Carbon Dioxide   28


 


Anion Gap   14


 


BUN   16


 


Creatinine   0.8


 


Est GFR ( Amer)   > 60


 


Est GFR (Non-Af Amer)   > 60


 


Random Glucose   84


 


Calcium   9.2














Assessment & Plan





- Assessment and Plan (Free Text)


Assessment: 





28M with left thigh superficial thrombophlebitis and cellulitis 


Plan: 





ABx


NSAIDs prn


Warm compresses to affected region 


F/u Venous duplex


Elevate affected leg


DVT ppx


Medical management per primary team 


Further recs per Dr. Heber Patel PGY3

## 2018-07-16 NOTE — CP.PCM.PN
<Leti Burns - Last Filed: 07/16/18 19:09>





Subjective





- Date & Time of Evaluation


Date of Evaluation: 07/16/18


Time of Evaluation: 10:01





- Subjective


Subjective: 





Patient seen and examined at bedside. He states that he has had pain and 

swelling of the veins in his left upper thigh, that has worsened and spread up 

his thigh toward his groin, that started 4 days ago. States he had this once 

before about 6 months ago, for which he was treated with antibiotics. He denies 

any bite or injury to the area. He admits he has felt tired and has had sweats 

at night for the past 6 months or so. He admits to intermittent pain in his 

right testicle, none currently. He denies any urethral discharge.  He denies 

fevers, chills, headache, lightheadedness, shortness of breath, chest pain, 

abdominal pain, nausea, vomiting, diarrhea, constipation, dysuria. He states he 

has urinated today without any difficulty. 





Objective





- Vital Signs/Intake and Output


Vital Signs (last 24 hours): 


 











Temp Pulse Resp BP Pulse Ox


 


 98.5 F   52 L  20   132/92 H  99 


 


 07/16/18 08:00  07/16/18 08:00  07/16/18 08:00  07/16/18 08:00  07/16/18 08:00








Intake and Output: 


 











 07/16/18 07/16/18





 06:59 18:59


 


Intake Total  100


 


Balance  100














- Medications


Medications: 


 Current Medications





Enoxaparin Sodium (Lovenox)  120 mg SC Q12H [facility]


   Last Admin: 07/16/18 07:00 Dose:  Not Given


Vancomycin/Sodium Chloride (Vancomycin 1 Gm/Ns 200 Ml)  1 gm in 200 mls @ 166.6 

mls/hr IVPB Q12H [facility]


   PRN Reason: Protocol


   Stop: 07/20/18 23:01


   Last Admin: 07/15/18 22:01 Dose:  166.6 mls/hr


Piperacillin Sod/Tazobactam Sod (Zosyn 3.375 Gm Iv Premix)  3.375 gm in 50 mls 

@ 100 mls/hr IVPB Q6H [facility]


   PRN Reason: Protocol


   Last Admin: 07/16/18 05:39 Dose:  100 mls/hr


Ibuprofen (Motrin Tab)  600 mg PO TID [facility]


   Last Admin: 07/15/18 19:14 Dose:  600 mg


Ketorolac Tromethamine (Toradol)  30 mg IVP Q6H PRN


   PRN Reason: Pain, severe (8-10)


Ketorolac Tromethamine (Toradol)  15 mg IVP Q6H PRN


   PRN Reason: Pain, moderate (4-7)


Pantoprazole Sodium (Protonix Ec Tab)  40 mg PO DAILY [facility]


Pneumococcal Polyvalent Vaccine (Pneumovax 23 Vaccine)  0.5 ml IM .ONCE ONE


   Stop: 07/17/18 10:01


Saccharomyces Boulardii (Florastor)  250 mg PO BID [facility]


   Last Admin: 07/15/18 19:16 Dose:  Not Given











- Labs


Labs: 


 





 07/16/18 06:52 





 07/16/18 06:52 











- Constitutional


Appears: Well, No Acute Distress (He is laying in bed with feet elevated. )





- Head Exam


Head Exam: ATRAUMATIC, NORMOCEPHALIC





- Eye Exam


Eye Exam: EOMI





- ENT Exam


ENT Exam: Mucous Membranes Moist





- Neck Exam


Neck Exam: absent: Tenderness





- Respiratory Exam


Respiratory Exam: Clear to Ausculation Bilateral, NORMAL BREATHING PATTERN.  

absent: Rales, Rhonchi, Wheezes, Respiratory Distress, Stridor





- Cardiovascular Exam


Cardiovascular Exam: REGULAR RHYTHM, +S1, +S2





- GI/Abdominal Exam


GI & Abdominal Exam: Soft, Normal Bowel Sounds.  absent: Firm, Guarding, Rigid, 

Tenderness





-  Exam


 Exam: absent: Scrotal Swelling, Testicular Tenderness, Uretheral Discharge


External exam: absent: Ecchymosis, Erythema, Lacerations, Lesions (Left 

inguinal nontender lymphadenopathy), Swelling


Additional comments: 





left inguinal lymphadenopathy, non tender 





- Extremities Exam


Extremities Exam: Normal Capillary Refill, Tenderness (tender dilated prominent 

superficial vein on left anterior thigh extending up to left anterior thigh. no 

other edema of left anterior thigh or lower extremity noted. Right lower 

extremity nonerythematous, nonedematous, nontender.  ).  absent: Calf Tenderness

, Pedal Edema


Additional comments: 





Left lower extremity: tender dilated prominent superficial vein on left 

anterior thigh extending up to left anterior thigh. no other edema of left 

anterior thigh or lower extremity noted. Straight line demarcation on left 

anterior thigh noted, likely tan. Slightly warm to touch. No lymphangitis 

noted. No active drainage noted. 


Right lower extremity nonerythematous, nonedematous, nontender.  





- Back Exam


Back Exam: paraspinal tenderness (on left lower lumbar).  absent: CVA 

tenderness (L), CVA tenderness (R), rash noted





- Neurological Exam


Neurological Exam: Alert, Awake, Oriented x3





- Skin


Skin Exam: Dry, Intact, Warm





Assessment and Plan





- Assessment and Plan (Free Text)


Plan: 





Assessment/plan





Left thigh cellulitis, thrombophlebitis. 





Toradol 30mg IV PRN pain 


Ibuprofen 600mg PO TID 





IV antibiotics: 


Zosyn 3.375g IV Q 6 started 7/15/18 


Vancomycin 1g IV Q12 started 7/15/18 





ID Dr. Johnson consulted, help appreciated


 


7/16/18: As per ID Dr. Johnson's note, recommended IV antibiotics for Staph and 

Strep coverage. 


7/16/18: Case discussed with Dr. Johnson, who recommended awaiting final results 

blood cultures prior to discharge. If negative, Dr. Johnson stated that could be 

treated with Clindamycin 300mg QID for 7 days. 





7/15/18 Lower extremity CT 1. Diffuse thickening of the anterior skin and 

subcutaneous soft tissues of the left thigh with associated reticulation and 

edema within the soft tissues suggestive for an underlying cellulitis. 2. At 

the site of the palpable abnormality as well as extending throughout the entire 

extent of the left anterior thigh are multiple large prominent vessels/

vasculature. The vessels are markedly enlarged and tortuous in comparison to 

the right thigh and may represent vascular malformations versus thrombosed 

varicosities versus additional etiology.  Clinical correlation. 3 Prominent 

lymphadenopathy within the left inguinal region measuring up to 3.8 x 2.3 

centimeters. 4. 1.3 centimeter radiodense focus seen within the distal 

medullary cavity of the left femur of uncertain clinical etiology possibly 

representing a chondroid focus versus prominent bone island versus additional 

etiology. 


7/15/18 Left lower venous doppler: superficial phlebitis in the left lower 

extremity, involving multiple varicose veins in the outer lateral low thigh and 

knees areas. No evidence of deep veins thrombosis of the left lower extremity. 

normal venous flow noted in the right common femoral vein. 





Vascular Surgery Dr. Buck consulted, help appreciated. 


7/16/18 As per Vascular surgery note, recommended compressive stockings, NSAIDs

, warm compresses, elevation of extremity and discharge home. 


Follow up Vanco trough 


Follow up on results of HIV, hepatitis studies


Pending final results of blood cultures





History of intermittent right testicular pain 


7/16/18 Testicular US : symmetric testicular doppler flow without evidence for 

torsion. Borderline enlarged, dilated right gonadal veins raise the possibility 

of varicocele. 


Elevate scrotum 





History of night sweats, fatigue, rule out lymphoma 


7/16/18 CT chest/abdomen/pelvis: no evidence of lymphadenopathy. no significant 

findings of chest, abdomen or pelvis. 


Follow up on LDH, HIV, hepatitis studies. 





History of leukocytosis 


WBC 12.0 --> 9.2 


Continue to monitor 





History of urinary retention


Patient able to void today without difficulty 


Continue to monitor 





Prophylaxis: 


Florastor 250mg PO BID


Protonix 40mg PO daily


Heart healthy 2g low carb diet 


Lovenox 40 SC daily





Disposition: Follow up on Vanco trough, blood cultures, HIV, hepatitis, LDH. 

Clear with ID Dr. Johnson prior to discharge.  





<Kristine Kim - Last Filed: 07/16/18 22:44>





Objective





- Vital Signs/Intake and Output


Vital Signs (last 24 hours): 


 











Temp Pulse Resp BP Pulse Ox


 


 98.5 F   52 L  20   132/92 H  99 


 


 07/16/18 08:00  07/16/18 08:00  07/16/18 08:00  07/16/18 08:00  07/16/18 08:00








Intake and Output: 


 











 07/16/18 07/16/18





 06:59 18:59


 


Intake Total  400


 


Balance  400














- Medications


Medications: 


 Current Medications





Vancomycin/Sodium Chloride (Vancomycin 1 Gm/Ns 200 Ml)  1 gm in 200 mls @ 166.6 

mls/hr IVPB Q12H [facility]


   PRN Reason: Protocol


   Stop: 07/20/18 23:01


   Last Admin: 07/16/18 10:41 Dose:  166.6 mls/hr


Piperacillin Sod/Tazobactam Sod (Zosyn 3.375 Gm Iv Premix)  3.375 gm in 50 mls 

@ 100 mls/hr IVPB Q6H [facility]


   PRN Reason: Protocol


   Last Admin: 07/16/18 12:36 Dose:  100 mls/hr


Ibuprofen (Motrin Tab)  600 mg PO TID [facility]


   Last Admin: 07/16/18 14:03 Dose:  600 mg


Ketorolac Tromethamine (Toradol)  30 mg IVP Q6H [facility]


   PRN Reason: Pain, severe (8-10)


   Last Admin: 07/16/18 12:40 Dose:  30 mg


Ketorolac Tromethamine (Toradol)  15 mg IVP Q6H PRN


   PRN Reason: Pain, moderate (4-7)


Pantoprazole Sodium (Protonix Ec Tab)  40 mg PO DAILY [facility]


   Last Admin: 07/16/18 10:33 Dose:  40 mg


Pneumococcal Polyvalent Vaccine (Pneumovax 23 Vaccine)  0.5 ml IM .ONCE ONE


   Stop: 07/17/18 10:01


Saccharomyces Boulardii (Florastor)  250 mg PO BID [facility]


   Last Admin: 07/16/18 10:34 Dose:  250 mg











- Labs


Labs: 


 





 07/16/18 06:52 





 07/16/18 06:52 











Attending/Attestation





- Attestation


I have personally seen and examined this patient.: Yes


I have fully participated in the care of the patient.: Yes


I have reviewed all pertinent clinical information, including history, physical 

exam and plan: Yes


Notes (Text): 


Patient seen, examined, case discussed with medical resident.


She reporting nonspecific symptoms including night sweats, fatigue, and 

questionable weight loss. He reports his girlfriend is by 6 was pregnant and 

has been working a lot.


Patient also reported for testicular sensitivity. He denies any lumps or bumps. 

He reports that he checks himself in the shower. Patient noted primarily pain 

over the left upper thigh where in the veins are noted mostly distended.


Patient was seen and evaluated by both vascular surgery infectious disease. 

Vascular surgery recommends for discharge with NSAIDs elevation. Infectious 

disease following the recommendation for vascular is recommended to be on blood 

cultures to make sure that there is no bacteremia prior to discharge.


We have completed CT chest and pelvis with IV contrast to rule out any other 

sources of lymphadenopathy which is negative. Ordered for testicular ultrasound 

which does not show any torsion but likely possibly varicocele as well.


We will wait for blood cultures for possible discharge tomorrow.





Assessment/Plan


1) Left thigh cellulitis &  thrombophlebitis


Assessment plan


* Infectious Disease (Dr. Johnson) on board-->help appreciated


* Vascular Surgery (Dr. Buck) on board-->help appreciated


* 7/15 CT results: 1. Diffuse thickening of the anterior skin and subcutaneous 

soft tissues of the left thigh with associated reticulation and edema within 

the soft tissues suggestive for an underlying cellulitis. 2. At the site of the 

palpable abnormality as well as extending throughout the entire extent of the 

left anterior thigh are multiple large prominent vessels/vasculature. The 

vessels are markedly enlarged and tortuous in comparison to the right thigh and 

may represent vascular malformations versus thrombosed varicosities versus 

additional etiology.  Clinical correlation. 3 Prominent lymphadenopathy within 

the left inguinal region measuring up to 3.8 x 2.3 centimeters. 4. 1.3 

centimeter radiodense focus seen within the distal medullary cavity of the left 

femur of uncertain clinical etiology possibly representing a chondroid focus 

versus prominent bone island versus additional etiology. 


* Vancomycin 1 gm Q12H


 * F/u Vanc troph 11:30 PM 7/16/18


* Zosyn 3.375 gm Q6 Hr


* Blood culture (7/15): awaiting results


* Ibuprofen 600 mg PO Q8H


* Warm compress to entire left thigh


* Keep left leg elevated above, level of heart


* Toradol 15 mg IV Q6H Mod Pain PRN


* Toradol 30 mg IV Q6H Severe Pain pRN


* Protonix 40m PO Daily


* Ruled out for DVT-->d/c therapuetic lovenox.





2) Leukocytosis


Assessment plan


* WBC slightly elevated w/o shift on admission


* Normalized today


* Vancomycin 1 gm Q12H


 * F/u Vanc troph 11:30 PM 7/16/18


* Zosyn 3.375 gm Q6 Hr


* pending hepatitis, hiv





3) Unable to void complete--.Resolved


Assessment plan


* resolved voided today





4) testicular pain


Assessment plan


* symmetrical testicular Doppler flow without evidence for torsion. Borderline 

enlarged, dilated right gonadal veins raise the possibility of varicocele





5) Fatigue, night sweats


Assessment plan


* No evidence of lymphadenoapthy. No significant finding in the chest, abdomen, 

pelvis





6) Asymptomatic Bradycardia


Assessment plan


* noted on EKG





7) Prophylaxis


* Protonix 40mg PO daily


* Lovonox 40mg subqdail


* Florastor 250mg PO BID


* Heart healthy 2 gm low carb diet

## 2018-07-16 NOTE — US
Date of service: 



07/16/2018



HISTORY:

pain, sensitivity on right side



TECHNIQUE:

Realtime sonography through the scrotum with color and doppler flow.



COMPARISON:

None Available.



FINDINGS:



RIGHT TESTICLE:

Measures 4.4 x 2.1 x 3.1 cm. Normal echotexture and flow.



RIGHT EPIDIDYMIS:

Epididymal head measures 0.8 x 1.0 x 1.6 cm. Epididymal head cyst/ 

spermatocele measuring 0.5 x 0.4 x 0.7 cm. Normal flow.



LEFT TESTICLE:

Measures 3.9 x 2.1 x 2.7 cm. Normal echotexture and flow.



LEFT EPIDIDYMIS:

Epididymal head measures 1.0 x 1.0 x 1.1 cm. Grossly unremarkable 

appearance with normal flow.



HYDROCELE:

None.



VARICOCELE:

Borderline enlarged, dilated right gonadal veins.



OTHER FINDINGS:

None. 



IMPRESSION:

Symmetric testicular Doppler flow without evidence for torsion.



Borderline enlarged, dilated right gonadal veins raise the 

possibility of varicocele.

## 2018-07-16 NOTE — CT
Date of service: 



07/16/2018



PROCEDURE:  CT Chest, Abdomen and Pelvis with intravenous contrast



HISTORY:

lymphadenopathy



COMPARISON:

None.



TECHNIQUE:

IV dose administered:  100 mL Visipaque 320 



Radiation dose:



Total exam DLP = 1882.3 mGy-cm.



This CT exam was performed using one or more of the following dose 

reduction techniques: Automated exposure control, adjustment of the 

mA and/or kV according to patient size, and/or use of iterative 

reconstruction technique.



FINDINGS:



CT CHEST WITH CONTRAST:



LUNGS:

Clear. No nodule, mass or consolidation.



MEDIASTINUM:

Unremarkable. Normal caliber aorta and pulmonary arterial trunk. No 

aortic dissection. Normal size heart.



LYMPH NODES:

Unremarkable.



PLEURA:

Unremarkable. No pneumothorax. No pleural fluid.



BONES:

Unremarkable.



OTHER FINDINGS:

None.



CT ABDOMEN AND PELVIS:



LIVER:

Unremarkable. No gross lesion or ductal dilatation. 



GALLBLADDER AND BILE DUCTS:

Unremarkable. 



PANCREAS:

Unremarkable. No gross lesion or ductal dilatation.



SPLEEN:

Unremarkable. 



ADRENALS:

Unremarkable. No mass. 



KIDNEYS AND URETERS:

Tiny right lower pole cyst. No hydronephrosis. No solid mass. 



VASCULATURE:

Unremarkable. No aortic aneurysm. 



BOWEL:

Unremarkable. No obstruction. No gross mural thickening. 



APPENDIX:

Normal appendix. 



PERITONEUM:

Tiny fat containing umbilical hernia. Small fat containing right 

inguinal hernia. No free fluid. No free air. 



LYMPH NODES:

Unremarkable. No enlarged lymph nodes. 



BLADDER:

Unremarkable. 



REPRODUCTIVE:

Unremarkable. 



BONES:

No acute fracture. 



OTHER FINDINGS:

None.



IMPRESSION:

No evidence of lymphadenopathy.



No significant finding in the chest, abdomen or pelvis.

## 2018-07-17 VITALS — TEMPERATURE: 99.5 F | HEART RATE: 64 BPM | SYSTOLIC BLOOD PRESSURE: 138 MMHG | DIASTOLIC BLOOD PRESSURE: 94 MMHG

## 2018-07-17 VITALS — OXYGEN SATURATION: 97 %

## 2018-07-17 LAB
ALBUMIN SERPL-MCNC: 3.9 G/DL (ref 3.5–5)
ALBUMIN/GLOB SERPL: 1.3 {RATIO} (ref 1–2.1)
ALT SERPL-CCNC: 37 U/L (ref 21–72)
AST SERPL-CCNC: 21 U/L (ref 17–59)
BASOPHILS # BLD AUTO: 0 K/UL (ref 0–0.2)
BASOPHILS NFR BLD: 0.5 % (ref 0–2)
BILIRUB UR-MCNC: NEGATIVE MG/DL
BUN SERPL-MCNC: 13 MG/DL (ref 9–20)
CALCIUM SERPL-MCNC: 8.7 MG/DL (ref 8.6–10.4)
EOSINOPHIL # BLD AUTO: 0.3 K/UL (ref 0–0.7)
EOSINOPHIL NFR BLD: 3.3 % (ref 0–4)
ERYTHROCYTE [DISTWIDTH] IN BLOOD BY AUTOMATED COUNT: 13.9 % (ref 11.5–14.5)
GFR NON-AFRICAN AMERICAN: > 60
GLUCOSE UR STRIP-MCNC: NORMAL MG/DL
HEPATITIS A IGM: NEGATIVE
HEPATITIS B CORE AB: NEGATIVE
HEPATITIS C ANTIBODY: NEGATIVE
HGB BLD-MCNC: 13 G/DL (ref 12–18)
LEUKOCYTE ESTERASE UR-ACNC: (no result) LEU/UL
LYMPHOCYTES # BLD AUTO: 1.7 K/UL (ref 1–4.3)
LYMPHOCYTES NFR BLD AUTO: 18.8 % (ref 20–40)
MCH RBC QN AUTO: 30.3 PG (ref 27–31)
MCHC RBC AUTO-ENTMCNC: 35.3 G/DL (ref 33–37)
MCV RBC AUTO: 85.9 FL (ref 80–94)
MONOCYTES # BLD: 0.5 K/UL (ref 0–0.8)
MONOCYTES NFR BLD: 6 % (ref 0–10)
NEUTROPHILS # BLD: 6.3 K/UL (ref 1.8–7)
NEUTROPHILS NFR BLD AUTO: 71.4 % (ref 50–75)
NRBC BLD AUTO-RTO: 0.1 % (ref 0–2)
PH UR STRIP: 6 [PH] (ref 5–8)
PLATELET # BLD: 161 K/UL (ref 130–400)
PMV BLD AUTO: 9.9 FL (ref 7.2–11.7)
PROT UR STRIP-MCNC: NEGATIVE MG/DL
RBC # BLD AUTO: 4.29 MIL/UL (ref 4.4–5.9)
RBC # UR STRIP: NEGATIVE /UL
SP GR UR STRIP: 1.03 (ref 1–1.03)
SQUAMOUS EPITHIAL: < 1 /HPF (ref 0–5)
UROBILINOGEN UR-MCNC: NORMAL MG/DL (ref 0.2–1)
WBC # BLD AUTO: 8.8 K/UL (ref 4.8–10.8)

## 2018-07-17 RX ADMIN — TAZOBACTAM SODIUM AND PIPERACILLIN SODIUM SCH MLS/HR: 375; 3 INJECTION, SOLUTION INTRAVENOUS at 14:00

## 2018-07-17 RX ADMIN — Medication SCH MG: at 10:02

## 2018-07-17 RX ADMIN — TAZOBACTAM SODIUM AND PIPERACILLIN SODIUM SCH MLS/HR: 375; 3 INJECTION, SOLUTION INTRAVENOUS at 00:02

## 2018-07-17 RX ADMIN — Medication SCH MG: at 17:08

## 2018-07-17 RX ADMIN — PANTOPRAZOLE SODIUM SCH MG: 40 TABLET, DELAYED RELEASE ORAL at 10:01

## 2018-07-17 RX ADMIN — TAZOBACTAM SODIUM AND PIPERACILLIN SODIUM SCH MLS/HR: 375; 3 INJECTION, SOLUTION INTRAVENOUS at 06:02

## 2018-07-17 RX ADMIN — TAZOBACTAM SODIUM AND PIPERACILLIN SODIUM SCH: 375; 3 INJECTION, SOLUTION INTRAVENOUS at 17:46

## 2018-07-17 RX ADMIN — VANCOMYCIN HYDROCHLORIDE SCH MLS/HR: 1 INJECTION, POWDER, LYOPHILIZED, FOR SOLUTION INTRAVENOUS at 10:02

## 2018-07-17 NOTE — CP.PCM.PN
Objective





- Vital Signs/Intake and Output


Vital Signs (last 24 hours): 


 











Temp Pulse Resp BP Pulse Ox


 


 98.3 F   63   20   132/75   97 


 


 07/17/18 00:00  07/17/18 00:00  07/17/18 00:00  07/17/18 00:00  07/17/18 00:05








Intake and Output: 


 











 07/16/18 07/17/18





 18:59 06:59


 


Intake Total 400 


 


Balance 400 














- Medications


Medications: 


 Current Medications





Enoxaparin Sodium (Lovenox)  40 mg SC DAILY [facility]


Vancomycin/Sodium Chloride (Vancomycin 1 Gm/Ns 200 Ml)  1 gm in 200 mls @ 166.6 

mls/hr IVPB Q12H [facility]


   PRN Reason: Protocol


   Stop: 07/20/18 23:01


   Last Admin: 07/16/18 22:07 Dose:  166.6 mls/hr


Piperacillin Sod/Tazobactam Sod (Zosyn 3.375 Gm Iv Premix)  3.375 gm in 50 mls 

@ 100 mls/hr IVPB Q6H [facility]


   PRN Reason: Protocol


   Last Admin: 07/17/18 06:02 Dose:  100 mls/hr


Ibuprofen (Motrin Tab)  600 mg PO TID [facility]


   Last Admin: 07/16/18 17:34 Dose:  600 mg


Ketorolac Tromethamine (Toradol)  30 mg IVP Q6H PRN


   PRN Reason: Pain, severe (8-10)


   Last Admin: 07/16/18 12:40 Dose:  30 mg


Ketorolac Tromethamine (Toradol)  15 mg IVP Q6H PRN


   PRN Reason: Pain, moderate (4-7)


Pantoprazole Sodium (Protonix Ec Tab)  40 mg PO DAILY [facility]


   Last Admin: 07/16/18 10:33 Dose:  40 mg


Pneumococcal Polyvalent Vaccine (Pneumovax 23 Vaccine)  0.5 ml IM .ONCE ONE


   Stop: 07/17/18 10:01


Saccharomyces Boulardii (Florastor)  250 mg PO BID [facility]


   Last Admin: 07/16/18 17:34 Dose:  250 mg











- Labs


Labs: 


 





 07/16/18 06:52 





 07/16/18 06:52

## 2018-07-17 NOTE — CP.PCM.PN
Subjective





- Date & Time of Evaluation


Date of Evaluation: 07/17/18


Time of Evaluation: 10:00





- Subjective


Subjective: 





still with pain and swelling


iv rx in progress





Objective





- Vital Signs/Intake and Output


Vital Signs (last 24 hours): 


 











Temp Pulse Resp BP Pulse Ox


 


 99 F   59 L  20   110/70   97 


 


 07/17/18 08:00  07/17/18 08:00  07/17/18 08:00  07/17/18 08:00  07/17/18 08:00











- Medications


Medications: 


 Current Medications





Enoxaparin Sodium (Lovenox)  40 mg SC DAILY [facility]


   Last Admin: 07/17/18 10:02 Dose:  40 mg


Vancomycin/Sodium Chloride (Vancomycin 1 Gm/Ns 200 Ml)  1 gm in 200 mls @ 166.6 

mls/hr IVPB Q12H [facility]


   PRN Reason: Protocol


   Stop: 07/20/18 23:01


   Last Admin: 07/17/18 10:02 Dose:  166.6 mls/hr


Piperacillin Sod/Tazobactam Sod (Zosyn 3.375 Gm Iv Premix)  3.375 gm in 50 mls 

@ 100 mls/hr IVPB Q6H [facility]


   PRN Reason: Protocol


   Last Admin: 07/17/18 06:02 Dose:  100 mls/hr


Ibuprofen (Motrin Tab)  600 mg PO TID [facility]


   Last Admin: 07/17/18 10:01 Dose:  Not Given


Ketorolac Tromethamine (Toradol)  30 mg IVP Q6H PRN


   PRN Reason: Pain, severe (8-10)


   Last Admin: 07/16/18 12:40 Dose:  30 mg


Ketorolac Tromethamine (Toradol)  15 mg IVP Q6H PRN


   PRN Reason: Pain, moderate (4-7)


Pantoprazole Sodium (Protonix Ec Tab)  40 mg PO DAILY [facility]


   Last Admin: 07/17/18 10:01 Dose:  40 mg


Saccharomyces Boulardii (Florastor)  250 mg PO BID [facility]


   Last Admin: 07/17/18 10:02 Dose:  250 mg











- Labs


Labs: 


 





 07/17/18 07:10 





 07/17/18 07:10 











- Constitutional


Appears: Non-toxic, Chronically Ill





- Head Exam


Head Exam: NORMOCEPHALIC





- Eye Exam


Eye Exam: PERRL





- ENT Exam


ENT Exam: Mucous Membranes Dry





- Neck Exam


Neck Exam: absent: Lymphadenopathy





- Respiratory Exam


Respiratory Exam: Decreased Breath Sounds





- Cardiovascular Exam


Cardiovascular Exam: REGULAR RHYTHM





- GI/Abdominal Exam


GI & Abdominal Exam: Distended





- Rectal Exam


Rectal Exam: Deferred





-  Exam


 Exam: NORMAL INSPECTION





- Extremities Exam


Extremities Exam: absent: Pedal Edema





- Back Exam


Back Exam: absent: CVA tenderness (L), CVA tenderness (R)





- Neurological Exam


Neurological Exam: Alert, Awake





Assessment and Plan


(1) Phlebitis


Status: Acute   





(2) Cellulitis


Status: Acute

## 2018-07-17 NOTE — CP.PCM.DIS
<Stan Calles - Last Filed: 07/17/18 20:11>





Provider





- Provider


Date of Admission: 


07/15/18 15:29





Attending physician: 


Lobito De La Cruz MD





Time Spent in preparation of Discharge (in minutes): 40





Diagnosis





- Discharge Diagnosis


(1) Phlebitis


Status: Acute   


Comment: Patient given NSAIDs, pain improving, inflammation improving.   





(2) Cellulitis


Status: Acute   


Comment: Pt discharged on clindamycin 300 mg Q8 for 7 days.   





Hospital Course





- Lab Results


Lab Results: 


 Micro Results





07/15/18 12:04   Blood   Blood Culture - Preliminary


                            NO GROWTH AFTER 48 HOURS


07/15/18 12:04   Blood   Blood Culture - Preliminary


                            NO GROWTH AFTER 48 HOURS





 Most Recent Lab Values











WBC  8.8 K/uL (4.8-10.8)   07/17/18  07:10    


 


RBC  4.29 Mil/uL (4.40-5.90)  L  07/17/18  07:10    


 


Hgb  13.0 g/dL (12.0-18.0)   07/17/18  07:10    


 


Hct  36.8 % (35.0-51.0)   07/17/18  07:10    


 


MCV  85.9 fL (80.0-94.0)   07/17/18  07:10    


 


MCH  30.3 pg (27.0-31.0)   07/17/18  07:10    


 


MCHC  35.3 g/dL (33.0-37.0)   07/17/18  07:10    


 


RDW  13.9 % (11.5-14.5)   07/17/18  07:10    


 


Plt Count  161 K/uL (130-400)   07/17/18  07:10    


 


MPV  9.9 fL (7.2-11.7)   07/17/18  07:10    


 


Neut % (Auto)  71.4 % (50.0-75.0)   07/17/18  07:10    


 


Lymph % (Auto)  18.8 % (20.0-40.0)  L  07/17/18  07:10    


 


Mono % (Auto)  6.0 % (0.0-10.0)   07/17/18  07:10    


 


Eos % (Auto)  3.3 % (0.0-4.0)   07/17/18  07:10    


 


Baso % (Auto)  0.5 % (0.0-2.0)   07/17/18  07:10    


 


Neut # (Auto)  6.3 K/uL (1.8-7.0)   07/17/18  07:10    


 


Lymph # (Auto)  1.7 K/uL (1.0-4.3)   07/17/18  07:10    


 


Mono # (Auto)  0.5 K/uL (0.0-0.8)   07/17/18  07:10    


 


Eos # (Auto)  0.3 K/uL (0.0-0.7)   07/17/18  07:10    


 


Baso # (Auto)  0.0 K/uL (0.0-0.2)   07/17/18  07:10    


 


Sodium  140 mmol/L (132-148)   07/17/18  07:10    


 


Potassium  3.6 mmol/L (3.6-5.2)   07/17/18  07:10    


 


Chloride  105 mmol/L ()   07/17/18  07:10    


 


Carbon Dioxide  26 mmol/L (22-30)   07/17/18  07:10    


 


Anion Gap  12  (10-20)   07/17/18  07:10    


 


BUN  13 mg/dL (9-20)   07/17/18  07:10    


 


Creatinine  0.8 mg/dL (0.8-1.5)   07/17/18  07:10    


 


Est GFR ( Amer)  > 60   07/17/18  07:10    


 


Est GFR (Non-Af Amer)  > 60   07/17/18  07:10    


 


Random Glucose  94 mg/dL ()   07/17/18  07:10    


 


Calcium  8.7 mg/dl (8.6-10.4)   07/17/18  07:10    


 


Phosphorus  3.7 mg/dL (2.5-4.5)   07/17/18  07:10    


 


Magnesium  2.0 mg/dL (1.6-2.3)   07/17/18  07:10    


 


Total Bilirubin  0.9 mg/dL (0.2-1.3)   07/17/18  07:10    


 


AST  21 U/L (17-59)   07/17/18  07:10    


 


ALT  37 U/L (21-72)   07/17/18  07:10    


 


Alkaline Phosphatase  71 U/L ()   07/17/18  07:10    


 


Lactate Dehydrogenase  372 U/L (313-618)   07/16/18  11:10    


 


Total Protein  6.9 g/dL (6.3-8.3)   07/17/18  07:10    


 


Albumin  3.9 g/dL (3.5-5.0)   07/17/18  07:10    


 


Globulin  3.1 gm/dL (2.2-3.9)   07/17/18  07:10    


 


Albumin/Globulin Ratio  1.3  (1.0-2.1)   07/17/18  07:10    


 


Urine Color  Yellow  (YELLOW)   07/17/18  07:57    


 


Urine Clarity  Clear  (Clear)   07/17/18  07:57    


 


Urine pH  6.0  (5.0-8.0)   07/17/18  07:57    


 


Ur Specific Gravity  1.026  (1.003-1.030)   07/17/18  07:57    


 


Urine Protein  Negative mg/dL (NEGATIVE)   07/17/18  07:57    


 


Urine Glucose (UA)  Normal mg/dL (Normal)   07/17/18  07:57    


 


Urine Ketones  Negative mg/dL (NEGATIVE)   07/17/18  07:57    


 


Urine Blood  Negative  (NEGATIVE)   07/17/18  07:57    


 


Urine Nitrate  Negative  (NEGATIVE)   07/17/18  07:57    


 


Urine Bilirubin  Negative  (NEGATIVE)   07/17/18  07:57    


 


Urine Urobilinogen  Normal mg/dL (0.2-1.0)   07/17/18  07:57    


 


Ur Leukocyte Esterase  Neg Gregorio/uL (Negative)   07/17/18  07:57    


 


Urine WBC (Auto)  < 1 /hpf (0-5)   07/17/18  07:57    


 


Urine RBC (Auto)  2 /hpf (0-3)   07/17/18  07:57    


 


Ur Squamous Epith Cells  < 1 /hpf (0-5)   07/17/18  07:57    


 


Vancomycin Trough  5.3 ug/mL (5.0-10.0)   07/16/18  19:48    


 


Urine Opiates Screen  Negative  (NEGATIVE)   07/16/18  07:00    


 


Urine Methadone Screen  Negative  (NEGATIVE)   07/16/18  07:00    


 


Ur Barbiturates Screen  Negative  (NEGATIVE)   07/16/18  07:00    


 


Ur Phencyclidine Scrn  Negative  (NEGATIVE)   07/16/18  07:00    


 


Ur Amphetamines Screen  Negative  (NEGATIVE)   07/16/18  07:00    


 


U Benzodiazepines Scrn  Negative  (NEGATIVE)   07/16/18  07:00    


 


U Oth Cocaine Metabols  Negative  (NEGATIVE)   07/16/18  07:00    


 


U Cannabinoids Screen  Positive  (NEGATIVE)  H  07/16/18  07:00    


 


Hepatitis A IgM Ab  Negative  (NEGATIVE)   07/17/18  07:10    


 


Hep Bs Antigen  Negative  (NEGATIVE)   07/17/18  07:10    


 


Hep B Core IgM Ab  Negative  (NEGATIVE)   07/17/18  07:10    


 


Hepatitis C Antibody  Negative  (NEGATIVE)   07/17/18  07:10    


 


HIV 1&2 Antibody Screen  Negative  (NEGATIVE)   07/17/18  07:10    














- Hospital Course


Hospital Course: 





HPI: 28 year old  male w/ no PMHx presents to ED w/ 3 days history of 

left upper leg swelling with increasing pain. The pain started as a small nodule

, just superior to the knee that has spread superiorly to his inguinal area. 

Patient states that the leg feels warmer and is unable to stand w/o pain. Pain 

is currently rated as a 6/10. Patient reports [place] some antibiotics from [place] 

[place]ure [place] for 2 days prior w/ no relief of symptoms.  

Patient reports having a similar episode 1 year prior that resolved with some 

antibiotics. 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 

trauma to the area.





During course, patient had a CT of leg to rule of infection, but with clinical 

exam it revealed likely superficial thrombophlebitis. Patient was given 

ibuprofen 600 mg Q8 during inpatient and warm compress. Patient reported 

improvement in pain.  Patient also had Testicular ultra sound suggestion 

varicocle  of R side. CT abdomen was negative for any significant findings, 

secondary to B like symptoms reported by patient. Pt was discharged on 

antibiotics for coverage possible due to infection, though likely not. 





The above is only a summary of the patients stay in the hospital. For more 

details, please refer to the full chart. 


Below is the instructions that were provided to the patient on discharge from 

the hospital. 





Patient is stable for discharge per Dr. Kim.





Patient should discontinue his home medication, Naproxen 220mg.





Patient is given prescriptions for the following medications and should start 

them:


Naproxen 500 mg twice a day (please space out by 12 hours, for 14 days)


Pepcid 20 mg twice a day (please space out by 12 hours, for 30 days)


Clindamycin 300 mg three times a day (please space out by 8 hours, for 7 days)


Bacid Acidophilus 1 tablet once a day (please do not take within 2 hours of 

taking clindamycin, please take for 30 days)





If the bacid acidophilus is expensive, you can substitute yogurt with 

probiotics once a day.  





Please should follow up in the [facility] in one (1) to 

two (2) weeks. Please call to make an appointment [phone].





Please wear supportive undergarments for improvement of symptoms discussed 

during stay. 





If patient has any symptoms that arise again or worsen, please return to the 

nearest emergency medical facility. Symptoms include, but not limited to 

extreme pain in leg, swelling of calf muscles, redness increasing, difficulty 

breathing and or fevers.





Thank you and take care.





Discharge Exam





- Head Exam


Head Exam: ATRAUMATIC, NORMAL INSPECTION, NORMOCEPHALIC





- Eye Exam


Eye Exam: EOMI, Normal appearance, PERRL


Pupil Exam: NORMAL ACCOMODATION





- ENT Exam


ENT Exam: Mucous Membranes Moist





- Neck Exam


Neck exam: Full Rom





- Respiratory Exam


Respiratory Exam: NORMAL BREATHING PATTERN, UNREMARKABLE.  absent: Rales, 

Rhonchi, Wheezes





- Cardiovascular Exam


Cardiovascular Exam: +S1, +S2.  absent: Tachycardia, Irregular Rhythm, Systolic 

Murmur





- GI/Abdominal Exam


GI & Abdominal Exam: Normal Bowel Sounds, Soft.  absent: Rebound, Rigid, 

Tenderness





- Extremities Exam


Additional comments: 


No calf tenderness, no pedal edema, peripheral pulses intact


- LLE, warm to touch at thigh area


- Discoloration 1/2 way upper thigh, likely tan from shorts - as clear, 

straight line demarcation


- Supper 1/3 of thigh has blanchable erythema


- Palpable chord that starts near lateral area of patella that moves superiorly 

and runs medially into the inguinal region





- Neurological Exam


Neurological exam: Alert, CN II-XII Intact, Oriented x3





- Psychiatric Exam


Psychiatric exam: Normal Affect, Normal Mood





- Skin


Skin Exam: Dry, Intact, Normal Color, Warm





Discharge Plan





- Discharge Medications


Prescriptions: 


Clindamycin [Cleocin] 300 mg PO Q8 #21 cap


Famotidine [Pepcid] 20 mg PO Q12 #60 tab


Lactobacillus Acidophilus [Bacid Acidophilus] 1 cap PO DAILY #30 cap


Naproxen 500 mg PO BID #14 tablet





- Follow Up Plan


Condition: STABLE


Disposition: HOME/ ROUTINE


Instructions:  Phlebitis (DC), Cellulitis (DC), Cellulitis (GEN)


Additional Instructions: 


Patient is stable for discharge per Dr. Kim.





Patient should discontinue his home medication, Naproxen 220mg.





Patient is given prescriptions for the following medications and should start 

them:


Naproxen 500 mg twice a day (please space out by 12 hours, for 14 days)


Pepcid 20 mg twice a day (please space out by 12 hours, for 30 days)


Clindamycin 300 mg three times a day (please space out by 8 hours, for 7 days)


Bacid Acidophilus 1 tablet once a day (please do not take within 2 hours of 

taking clindamycin, please take for 30 days)





If the bacid acidophilus is expensive, you can substitute yogurt with 

probiotics once a day.  





Please should follow up in the [facility] in one (1) to 

two (2) weeks. Please call to make an appointment [phone].





Please wear supportive undergarments for improvement of symptoms discussed 

during stay. 





If patient has any symptoms that arise again or worsen, please return to the 

nearest emergency medical facility. Symptoms include, but not limited to 

extreme pain in leg, swelling of calf muscles, redness increasing, difficulty 

breathing and or fevers.





Thank you and take care.   





<Kristine Kim V - Last Filed: 07/18/18 05:15>





Provider





- Provider


Date of Admission: 


07/15/18 15:29





Attending physician: 


Lobito De La Cruz MD








Hospital Course





- Lab Results


Lab Results: 


 Micro Results





07/15/18 12:04   Blood   Blood Culture - Preliminary


                            NO GROWTH AFTER 48 HOURS


07/15/18 12:04   Blood   Blood Culture - Preliminary


                            NO GROWTH AFTER 48 HOURS





 Most Recent Lab Values











WBC  8.8 K/uL (4.8-10.8)   07/17/18  07:10    


 


RBC  4.29 Mil/uL (4.40-5.90)  L  07/17/18  07:10    


 


Hgb  13.0 g/dL (12.0-18.0)   07/17/18  07:10    


 


Hct  36.8 % (35.0-51.0)   07/17/18  07:10    


 


MCV  85.9 fL (80.0-94.0)   07/17/18  07:10    


 


MCH  30.3 pg (27.0-31.0)   07/17/18  07:10    


 


MCHC  35.3 g/dL (33.0-37.0)   07/17/18  07:10    


 


RDW  13.9 % (11.5-14.5)   07/17/18  07:10    


 


Plt Count  161 K/uL (130-400)   07/17/18  07:10    


 


MPV  9.9 fL (7.2-11.7)   07/17/18  07:10    


 


Neut % (Auto)  71.4 % (50.0-75.0)   07/17/18  07:10    


 


Lymph % (Auto)  18.8 % (20.0-40.0)  L  07/17/18  07:10    


 


Mono % (Auto)  6.0 % (0.0-10.0)   07/17/18  07:10    


 


Eos % (Auto)  3.3 % (0.0-4.0)   07/17/18  07:10    


 


Baso % (Auto)  0.5 % (0.0-2.0)   07/17/18  07:10    


 


Neut # (Auto)  6.3 K/uL (1.8-7.0)   07/17/18  07:10    


 


Lymph # (Auto)  1.7 K/uL (1.0-4.3)   07/17/18  07:10    


 


Mono # (Auto)  0.5 K/uL (0.0-0.8)   07/17/18  07:10    


 


Eos # (Auto)  0.3 K/uL (0.0-0.7)   07/17/18  07:10    


 


Baso # (Auto)  0.0 K/uL (0.0-0.2)   07/17/18  07:10    


 


Sodium  140 mmol/L (132-148)   07/17/18  07:10    


 


Potassium  3.6 mmol/L (3.6-5.2)   07/17/18  07:10    


 


Chloride  105 mmol/L ()   07/17/18  07:10    


 


Carbon Dioxide  26 mmol/L (22-30)   07/17/18  07:10    


 


Anion Gap  12  (10-20)   07/17/18  07:10    


 


BUN  13 mg/dL (9-20)   07/17/18  07:10    


 


Creatinine  0.8 mg/dL (0.8-1.5)   07/17/18  07:10    


 


Est GFR ( Amer)  > 60   07/17/18  07:10    


 


Est GFR (Non-Af Amer)  > 60   07/17/18  07:10    


 


Random Glucose  94 mg/dL ()   07/17/18  07:10    


 


Calcium  8.7 mg/dl (8.6-10.4)   07/17/18  07:10    


 


Phosphorus  3.7 mg/dL (2.5-4.5)   07/17/18  07:10    


 


Magnesium  2.0 mg/dL (1.6-2.3)   07/17/18  07:10    


 


Total Bilirubin  0.9 mg/dL (0.2-1.3)   07/17/18  07:10    


 


AST  21 U/L (17-59)   07/17/18  07:10    


 


ALT  37 U/L (21-72)   07/17/18  07:10    


 


Alkaline Phosphatase  71 U/L ()   07/17/18  07:10    


 


Lactate Dehydrogenase  372 U/L (313-618)   07/16/18  11:10    


 


Total Protein  6.9 g/dL (6.3-8.3)   07/17/18  07:10    


 


Albumin  3.9 g/dL (3.5-5.0)   07/17/18  07:10    


 


Globulin  3.1 gm/dL (2.2-3.9)   07/17/18  07:10    


 


Albumin/Globulin Ratio  1.3  (1.0-2.1)   07/17/18  07:10    


 


Urine Color  Yellow  (YELLOW)   07/17/18  07:57    


 


Urine Clarity  Clear  (Clear)   07/17/18  07:57    


 


Urine pH  6.0  (5.0-8.0)   07/17/18  07:57    


 


Ur Specific Gravity  1.026  (1.003-1.030)   07/17/18  07:57    


 


Urine Protein  Negative mg/dL (NEGATIVE)   07/17/18  07:57    


 


Urine Glucose (UA)  Normal mg/dL (Normal)   07/17/18  07:57    


 


Urine Ketones  Negative mg/dL (NEGATIVE)   07/17/18  07:57    


 


Urine Blood  Negative  (NEGATIVE)   07/17/18  07:57    


 


Urine Nitrate  Negative  (NEGATIVE)   07/17/18  07:57    


 


Urine Bilirubin  Negative  (NEGATIVE)   07/17/18  07:57    


 


Urine Urobilinogen  Normal mg/dL (0.2-1.0)   07/17/18  07:57    


 


Ur Leukocyte Esterase  Neg Gregorio/uL (Negative)   07/17/18  07:57    


 


Urine WBC (Auto)  < 1 /hpf (0-5)   07/17/18  07:57    


 


Urine RBC (Auto)  2 /hpf (0-3)   07/17/18  07:57    


 


Ur Squamous Epith Cells  < 1 /hpf (0-5)   07/17/18  07:57    


 


Vancomycin Trough  5.3 ug/mL (5.0-10.0)   07/16/18  19:48    


 


Urine Opiates Screen  Negative  (NEGATIVE)   07/16/18  07:00    


 


Urine Methadone Screen  Negative  (NEGATIVE)   07/16/18  07:00    


 


Ur Barbiturates Screen  Negative  (NEGATIVE)   07/16/18  07:00    


 


Ur Phencyclidine Scrn  Negative  (NEGATIVE)   07/16/18  07:00    


 


Ur Amphetamines Screen  Negative  (NEGATIVE)   07/16/18  07:00    


 


U Benzodiazepines Scrn  Negative  (NEGATIVE)   07/16/18  07:00    


 


U Oth Cocaine Metabols  Negative  (NEGATIVE)   07/16/18  07:00    


 


U Cannabinoids Screen  Positive  (NEGATIVE)  H  07/16/18  07:00    


 


Hepatitis A IgM Ab  Negative  (NEGATIVE)   07/17/18  07:10    


 


Hep Bs Antigen  Negative  (NEGATIVE)   07/17/18  07:10    


 


Hep B Core IgM Ab  Negative  (NEGATIVE)   07/17/18  07:10    


 


Hepatitis C Antibody  Negative  (NEGATIVE)   07/17/18  07:10    


 


HIV 1&2 Antibody Screen  Negative  (NEGATIVE)   07/17/18  07:10    














Attending/Attestation





- Attestation


I have personally seen and examined this patient.: Yes


I have fully participated in the care of the patient.: Yes


I have reviewed all pertinent clinical information, including history, physical 

exam and plan: Yes


Notes (Text): 





07/18/18 05:15


Assessment/Plan


1) Left thigh cellulitis &  thrombophlebitis


Assessment plan


* Infectious Disease (Dr. Johnson) on board-->help appreciated


* Vascular Surgery (Dr. Buck) on board-->help appreciated


* 7/15 CT results: 1. Diffuse thickening of the anterior skin and subcutaneous 

soft tissues of the left thigh with associated reticulation and edema within 

the soft tissues suggestive for an underlying cellulitis. 2. At the site of the 

palpable abnormality as well as extending throughout the entire extent of the 

left anterior thigh are multiple large prominent vessels/vasculature. The 

vessels are markedly enlarged and tortuous in comparison to the right thigh and 

may represent vascular malformations versus thrombosed varicosities versus 

additional etiology.  Clinical correlation. 3 Prominent lymphadenopathy within 

the left inguinal region measuring up to 3.8 x 2.3 centimeters. 4. 1.3 

centimeter radiodense focus seen within the distal medullary cavity of the left 

femur of uncertain clinical etiology possibly representing a chondroid focus 

versus prominent bone island versus additional etiology. 


* Vancomycin 1 gm Q12H


 * F/u Vanc troph 11:30 PM 7/16/18


* Zosyn 3.375 gm Q6 Hr


* Blood culture (7/15): awaiting results


* Ibuprofen 600 mg PO Q8H


* Warm compress to entire left thigh


* Keep left leg elevated above, level of heart


* Toradol 15 mg IV Q6H Mod Pain PRN


* Toradol 30 mg IV Q6H Severe Pain pRN


* Protonix 40m PO Daily


* Ruled out for DVT-->d/c therapuetic lovenox.





2) Leukocytosis


Assessment plan


* WBC slightly elevated w/o shift on admission


* Normalized today


* Vancomycin 1 gm Q12H


 * F/u Vanc troph 11:30 PM 7/16/18


* Zosyn 3.375 gm Q6 Hr


* pending hepatitis, hiv





3) Unable to void complete--.Resolved


Assessment plan


* resolved voided today





4) testicular pain


Assessment plan


* symmetrical testicular Doppler flow without evidence for torsion. Borderline 

enlarged, dilated right gonadal veins raise the possibility of varicocele





5) Fatigue, night sweats


Assessment plan


* No evidence of lymphadenoapthy. No significant finding in the chest, abdomen, 

pelvis





6) Asymptomatic Bradycardia


Assessment plan


* noted on EKG





7) Prophylaxis


* Protonix 40mg PO daily


* Lovonox 40mg subqdail


* Florastor 250mg PO BID